# Patient Record
Sex: FEMALE | Race: WHITE
[De-identification: names, ages, dates, MRNs, and addresses within clinical notes are randomized per-mention and may not be internally consistent; named-entity substitution may affect disease eponyms.]

---

## 2018-04-15 ENCOUNTER — HOSPITAL ENCOUNTER (EMERGENCY)
Dept: HOSPITAL 25 - UCCORT | Age: 68
Discharge: HOME | End: 2018-04-15
Payer: COMMERCIAL

## 2018-04-15 VITALS — DIASTOLIC BLOOD PRESSURE: 76 MMHG | SYSTOLIC BLOOD PRESSURE: 149 MMHG
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
## 2018-04-15 DIAGNOSIS — Z87.891: ICD-10-CM
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
## 2018-04-15 DIAGNOSIS — S52.502A: Primary | ICD-10-CM

## 2018-04-15 DIAGNOSIS — S52.615A: ICD-10-CM

## 2018-04-15 DIAGNOSIS — I10: ICD-10-CM

## 2018-04-15 PROCEDURE — G0463 HOSPITAL OUTPT CLINIC VISIT: HCPCS

## 2018-04-15 PROCEDURE — 99213 OFFICE O/P EST LOW 20 MIN: CPT

## 2018-04-15 NOTE — UC
Hand/Wrist HPI





- HPI Summary


HPI Summary: 





This nice lady comes into the urgent care today complaining of left wrist pain 

after a fall where she she reached her hand out to catch herself, this morning.





- History Of Current Complaint


Chief Complaint: UCUpperExtremity


Stated Complaint: LEFT WRIST INJURY S/P FALL


Time Seen by Provider: 04/15/18 08:07


Hx Obtained From: Patient


Pregnant?: No


Mechanism Of Injury: FOOSH injury


Onset/Duration: Sudden Onset, Still Present


Severity Initially: Severe


Severity Currently: Severe


Pain Intensity: 10


Pain Scale Used: 0-10 Numeric


Character Of Pain: Aching, Throbbing, Burning


Aggravating Factor(s): Movement


Alleviating Factor(s): Rest, Ice


Associated Signs And Symptoms: Positive: Swelling, Bruising


Related History: Dominant Hand Right





- Allergies/Home Medications


Allergies/Adverse Reactions: 


 Allergies











Allergy/AdvReac Type Severity Reaction Status Date / Time


 


No Known Allergies Allergy   Verified 04/15/18 08:06











Home Medications: 


 Home Medications





Lisinopril TAB* [Prinivil TAB 10 MG*] 40 mg QPM 04/15/18 [History Confirmed 04/

15/18]











PMH/Surg Hx/FS Hx/Imm Hx


Previously Healthy: No


Cardiovascular History: Hypertension





- Surgical History


Surgical History: None





- Family History


Known Family History: Positive: None





- Social History


Occupation: Employed Full-time - Works in a factory packaging eggs


Lives: With Family - Lives with her son


Alcohol Use: None


Substance Use Type: None


Smoking Status (MU): Former Smoker


Type: Cigarettes


Have You Smoked in the Last Year: No


When Did the Patient Quit Smoking/Using Tobacco: 2007





- Immunization History


Most Recent Tetanus Shot: 2013





Review of Systems


Constitutional: Negative


Skin: Negative


Eyes: Negative


ENT: Negative


Respiratory: Negative


Cardiovascular: Negative


Gastrointestinal: Negative


Genitourinary: Negative


Motor: Negative


Neurovascular: Negative


Musculoskeletal: Arthralgia - Left distal forearm pain and deformity


Neurological: Negative


Psychological: Negative


Is Patient Immunocompromised?: No


All Other Systems Reviewed And Are Negative: Yes





Physical Exam


Triage Information Reviewed: Yes


Appearance: Well-Appearing, No Pain Distress, Well-Nourished


Vital Signs: 


 Initial Vital Signs











Temp  98.5 F   04/15/18 08:07


 


Pulse  73   04/15/18 08:07


 


Resp  18   04/15/18 08:07


 


BP  149/76   04/15/18 08:07


 


Pulse Ox  99   04/15/18 08:07











Vital Signs Reviewed: Yes


Eye Exam: Normal


Eyes: Positive: Conjunctiva Clear


ENT Exam: Normal


ENT: Positive: Normal ENT inspection, Hearing grossly normal.  Negative: Nasal 

congestion, Trismus, Muffled voice, Hoarse voice


Neck exam: Normal


Neck: Positive: Supple, Nontender


Respiratory Exam: Normal


Respiratory: Positive: Chest non-tender, No respiratory distress, No accessory 

muscle use


Cardiovascular Exam: Normal


Cardiovascular: Positive: RRR, Pulses Normal, Brisk Capillary Refill


Musculoskeletal Exam: Other


Musculoskeletal: Positive: Strength Limited @ - Left wrist, ROM Limited @ - 

Left wrist left wrist, Edema @ - Left wrist


Neurological Exam: Normal


Neurological: Positive: Alert, Muscle Tone Normal


Psychological Exam: Normal


Skin Exam: Normal





Diagnostics





- Radiology


  ** No standard instances


Xray Interpretation: Positive (See Comments) - TECHNIQUE: 3 views left wrist.  

REPORT:   There is a mildly comminuted and impacted fracture at the distal left 

radius. On the lateral view there is a small degree of dorsal angulation. There 

is a nondisplaced fracture of the ulnar styloid. Chronic appearing degenerative 

changes include sclerotic change at the articulating surface of the proximal 

trapezium and triquetral bones and at the articulating surfaces of the left 

thumb metacarpal and trapezium. Remaining visualized bones are intact and 

appropriately aligned.  IMPRESSION:  Fracture of the distal left radius and 

ulna as described above.


Radiology Interpretation Completed By: ED Physician, Radiologist





Re-Evaluation





- Re-Evaluation


  ** First Eval


Change: Improved - Sugar tong splint applied to left wrist and forearm.  

Patient placed in a sling.  Neuro motor and circulation intact distally before 

and after splint placement.





Hand/Wrist Course/Dx





- Course


Course Of Treatment: Rest ice and elevate keep wrist in splint and use sling.  

Pain management with ibuprofen and hydrocodone.  off work until cleared by 

orthopedic doctor.  call Dr. San in the morning for follow-up appointment.





- Differential Dx/Diagnosis


Provider Diagnoses: Hypertension in poor control, slightly displaced distal 

radial fracture left wrist.  Nondisplaced ulnar styloid fracture left wrist





Discharge





- Sign-Out/Discharge


Documenting (check all that apply): Discharge





- Discharge Plan


Condition: Stable


Disposition: HOME


Prescriptions: 


Hydrocodone/Acetaminophen [Hydrocodone/Acetaminophen 5-325 mg] 1 tab PO Q4HR 

PRN #24 tab MDD 6


 PRN Reason: Pain


Ibuprofen TAB* [Motrin TAB* 600 MG] 600 mg PO Q6H PRN #40 tab


 PRN Reason: pain


Patient Education Materials:  Arm Fracture in Adults (ED), How to Use a Sling (

ED), Hypertension (ED)


Forms:  *Work Release


Referrals: 


Lance Reyes MD [Primary Care Provider] - 2 Weeks


José San MD [Medical Doctor] - 1 Day





- Billing Disposition and Condition


Condition: STABLE


Disposition: HOME

## 2018-04-15 NOTE — RAD
INDICATION: Left wrist pain after a fall.



COMPARISON: None.



TECHNIQUE: 3 views left wrist.



REPORT: 



There is a mildly comminuted and impacted fracture at the distal left radius. On the

lateral view there is a small degree of dorsal angulation. There is a nondisplaced

fracture of the ulnar styloid. Chronic appearing degenerative changes include sclerotic

change at the articulating surface of the proximal trapezium and triquetral bones and at

the articulating surfaces of the left thumb metacarpal and trapezium. Remaining visualized

bones are intact and appropriately aligned.



IMPRESSION: 

Fracture of the distal left radius and ulna as described above.